# Patient Record
Sex: MALE | Race: WHITE | NOT HISPANIC OR LATINO | Employment: FULL TIME | URBAN - METROPOLITAN AREA
[De-identification: names, ages, dates, MRNs, and addresses within clinical notes are randomized per-mention and may not be internally consistent; named-entity substitution may affect disease eponyms.]

---

## 2018-10-25 ENCOUNTER — HOSPITAL ENCOUNTER (EMERGENCY)
Facility: MEDICAL CENTER | Age: 23
End: 2018-10-25
Attending: EMERGENCY MEDICINE
Payer: MEDICAID

## 2018-10-25 VITALS
OXYGEN SATURATION: 97 % | BODY MASS INDEX: 25.51 KG/M2 | WEIGHT: 158.73 LBS | HEIGHT: 66 IN | RESPIRATION RATE: 16 BRPM | SYSTOLIC BLOOD PRESSURE: 135 MMHG | TEMPERATURE: 99.1 F | DIASTOLIC BLOOD PRESSURE: 74 MMHG | HEART RATE: 102 BPM

## 2018-10-25 DIAGNOSIS — H10.021 OTHER MUCOPURULENT CONJUNCTIVITIS OF RIGHT EYE: ICD-10-CM

## 2018-10-25 DIAGNOSIS — H01.001 BLEPHARITIS OF RIGHT UPPER EYELID, UNSPECIFIED TYPE: ICD-10-CM

## 2018-10-25 PROCEDURE — 99283 EMERGENCY DEPT VISIT LOW MDM: CPT

## 2018-10-25 RX ORDER — ERYTHROMYCIN 5 MG/G
1 OINTMENT OPHTHALMIC 3 TIMES DAILY
Qty: 1 TUBE | Refills: 1 | Status: SHIPPED | OUTPATIENT
Start: 2018-10-25

## 2018-10-25 RX ORDER — AMOXICILLIN 500 MG/1
500 CAPSULE ORAL 3 TIMES DAILY
Qty: 21 CAP | Refills: 0 | Status: SHIPPED | OUTPATIENT
Start: 2018-10-25 | End: 2018-11-01

## 2018-10-25 ASSESSMENT — PAIN SCALES - GENERAL: PAINLEVEL_OUTOF10: 3

## 2018-10-25 NOTE — ED PROVIDER NOTES
"ED Provider Note    CHIEF COMPLAINT  Chief Complaint   Patient presents with   • Eye Drainage   • Rash       HPI  Travis Titus Richard Rubinstein is a 23 y.o. male who presents for evaluation of 3 days of progressive worsening of right eye redness, crusting and now redness around the upper or lower eyelid.  The patient is otherwise healthy with no significant medical or surgical history.  He does not wear contact lenses no double vision no ocular trauma.  No high fevers or chills      REVIEW OF SYSTEMS  See HPI for further details.  No high fevers chills night sweats weight loss double vision all other systems are negative.     PAST MEDICAL HISTORY  No past medical history on file.  None reported  FAMILY HISTORY  Noncontributory    SOCIAL HISTORY  Social History     Social History   • Marital status: N/A     Spouse name: N/A   • Number of children: N/A   • Years of education: N/A     Social History Main Topics   • Smoking status: Never Smoker   • Smokeless tobacco: Never Used   • Alcohol use No   • Drug use: No   • Sexual activity: Not on file     Other Topics Concern   • Not on file     Social History Narrative   • No narrative on file   No IV drug    SURGICAL HISTORY  No past surgical history on file.  No major surgery  CURRENT MEDICATIONS  Home Medications    **Home medications have not yet been reviewed for this encounter**     No regular meds    ALLERGIES  No Known Allergies    PHYSICAL EXAM  VITAL SIGNS: /74   Pulse (!) 102   Temp 37.3 °C (99.1 °F)   Resp 16   Ht 1.676 m (5' 6\")   Wt 72 kg (158 lb 11.7 oz)   SpO2 97%   BMI 25.62 kg/m²       Constitutional: Well developed, Well nourished, No acute distress, Non-toxic appearance.   HENT: Normocephalic, Atraumatic, Bilateral external ears normal, Oropharynx moist, No oral exudates, Nose normal.   Eyes: PERRLA, right eye has significant scleral injection with honey colored crusting, upper and lower lid are erythematous and slightly swollen without " abscess or stye consistent with blepharitis  Neck: Normal range of motion, No tenderness, Supple, No stridor.   Cardiovascular: Normal heart rate, Normal rhythm, No murmurs, No rubs, No gallops.   Thorax & Lungs: Normal breath sounds, No respiratory distress, No wheezing, No chest tenderness.   Abdomen: Bowel sounds normal, Soft, No tenderness, No masses, No pulsatile masses.   Skin: Warm, Dry, No erythema, No rash.   Extremities: Intact distal pulses, No edema, No tenderness, No cyanosis, No clubbing.   Musculoskeletal: Good range of motion in all major joints. No tenderness to palpation or major deformities noted.   Neurologic: Alert & oriented x 3, Normal motor function, Normal sensory function, No focal deficits noted.   Psychiatric: Affect normal, Judgment normal, Mood normal.       COURSE & MEDICAL DECISION MAKING  Pertinent Labs & Imaging studies reviewed. (See chart for details)  Patient here presents with what is clearly bacterial conjunctivitis and now he has a component of blepharitis.  He does not wear contact lenses no evidence of systemic toxicity.  He has no visual acuity changes.  I will start him on erythromycin topical ointment as well as amoxicillin    FINAL IMPRESSION  1.  Right eye conjunctivitis with blepharitis      Electronically signed by: Adams Smith, 10/25/2018 3:11 PM

## 2018-10-25 NOTE — ED TRIAGE NOTES
"PT ambulated to triage c/o a stye in his rt eye with drainage and a rash  Chief Complaint   Patient presents with   • Eye Drainage   • Rash     Blood pressure 135/74, pulse (!) 102, temperature 37.3 °C (99.1 °F), resp. rate 16, height 1.676 m (5' 6\"), weight 72 kg (158 lb 11.7 oz), SpO2 97 %.      "

## 2018-10-30 ENCOUNTER — HOSPITAL ENCOUNTER (EMERGENCY)
Facility: MEDICAL CENTER | Age: 23
End: 2018-10-30
Attending: EMERGENCY MEDICINE
Payer: MEDICAID

## 2018-10-30 VITALS
OXYGEN SATURATION: 97 % | HEART RATE: 79 BPM | TEMPERATURE: 96.9 F | HEIGHT: 65 IN | WEIGHT: 157.85 LBS | DIASTOLIC BLOOD PRESSURE: 63 MMHG | SYSTOLIC BLOOD PRESSURE: 105 MMHG | BODY MASS INDEX: 26.3 KG/M2 | RESPIRATION RATE: 16 BRPM

## 2018-10-30 DIAGNOSIS — R21 RASH: ICD-10-CM

## 2018-10-30 PROCEDURE — 99283 EMERGENCY DEPT VISIT LOW MDM: CPT

## 2018-10-30 RX ORDER — PREDNISONE 20 MG/1
60 TABLET ORAL DAILY
Qty: 15 TAB | Refills: 0 | Status: SHIPPED | OUTPATIENT
Start: 2018-10-30 | End: 2018-11-04

## 2018-10-30 NOTE — ED TRIAGE NOTES
"Per patient \" I have hives all over my body\"  NO hives can be seen on his arms, face, neck.  NO respiratory system involvement from what I can tell.  He is not itching.  NO major s.s of distress.   "

## 2018-10-31 NOTE — ED PROVIDER NOTES
"ED Provider Note    CHIEF COMPLAINT  Chief Complaint   Patient presents with   • Rash       HPI  Travis Titus Richard Rubinstein is a 23 y.o. male who presents with a rash.  The patient presents with a diffuse rash approximate 1 week in duration.  He states that has a pleuritic component.  He does not have any associated fevers.  He does not have any change in his breathing patterns noticed any difficulty with swallowing.  He has not been on any medication recently nor is he had any change in his cosmetics nor diet.  The patient is otherwise healthy.    REVIEW OF SYSTEMS  No chest pain, no difficulty with breathing    PHYSICAL EXAM  VITAL SIGNS: /63   Pulse 79   Temp 36.1 °C (96.9 °F) (Temporal)   Resp 16   Ht 1.651 m (5' 5\")   Wt 71.6 kg (157 lb 13.6 oz)   SpO2 97%   BMI 26.27 kg/m²   In general the patient does not appear toxic  Oral cavity no uvular edema nor erythema  Pulmonary chest clear to auscultation bilaterally  Skin the patient does have a diffuse maculopapular rash that does spare the hands.      COURSE & MEDICAL DECISION MAKING  Pertinent Labs & Imaging studies reviewed. (See chart for details)  This a 23-year-old male who presents with a rash.  Based on the pruritic component I suspect this is from a allergic etiology.  The patient has had this for about a week and therefore will treat the patient with 5 days of prednisone.  He will take Benadryl as needed for the itching.  I do not appreciate any evidence of an infection he does not appear toxic.  If he does not have significant improvement in 40-72 hours or if he is acutely worse he will return for repeat examination.    FINAL IMPRESSION  1.  Rash     Disposition  The patient will be discharged in stable condition      Electronically signed by: Ross Castañeda, 10/30/2018 5:04 PM      When I was discharging the patient and noted that he had amoxicillin on his medical list.  In further speaking the patient he states they put him on " amoxicillin for conjunctivitis about a week ago.  I suspect this is the source of the rash.  The patient will stop the amoxicillin.

## 2023-12-12 ENCOUNTER — HOSPITAL ENCOUNTER (EMERGENCY)
Facility: MEDICAL CENTER | Age: 28
End: 2023-12-12
Attending: EMERGENCY MEDICINE

## 2023-12-12 VITALS
OXYGEN SATURATION: 96 % | DIASTOLIC BLOOD PRESSURE: 74 MMHG | SYSTOLIC BLOOD PRESSURE: 111 MMHG | TEMPERATURE: 97.1 F | HEIGHT: 66 IN | RESPIRATION RATE: 18 BRPM | WEIGHT: 162.7 LBS | HEART RATE: 71 BPM | BODY MASS INDEX: 26.15 KG/M2

## 2023-12-12 DIAGNOSIS — K04.7 DENTAL INFECTION: ICD-10-CM

## 2023-12-12 PROCEDURE — 99283 EMERGENCY DEPT VISIT LOW MDM: CPT

## 2023-12-12 PROCEDURE — A9270 NON-COVERED ITEM OR SERVICE: HCPCS | Performed by: EMERGENCY MEDICINE

## 2023-12-12 PROCEDURE — 700102 HCHG RX REV CODE 250 W/ 637 OVERRIDE(OP): Performed by: EMERGENCY MEDICINE

## 2023-12-12 RX ORDER — PENICILLIN V POTASSIUM 500 MG/1
500 TABLET ORAL ONCE
Status: COMPLETED | OUTPATIENT
Start: 2023-12-12 | End: 2023-12-12

## 2023-12-12 RX ORDER — PENICILLIN V POTASSIUM 500 MG/1
500 TABLET ORAL 4 TIMES DAILY
Qty: 40 TABLET | Refills: 0 | Status: ACTIVE | OUTPATIENT
Start: 2023-12-12

## 2023-12-12 RX ADMIN — PENICILLIN V POTASSIUM 500 MG: 500 TABLET, FILM COATED ORAL at 12:34

## 2023-12-12 ASSESSMENT — PAIN DESCRIPTION - PAIN TYPE: TYPE: ACUTE PAIN

## 2023-12-12 NOTE — ED TRIAGE NOTES
"Chief Complaint   Patient presents with    Tooth Ache     Pt has been having tooth pain right upper and lower molars for two weeks, pain 10/10, pt states he feels like he has an infection in his teeth.       /70   Pulse 95   Temp 36.6 °C (97.8 °F) (Temporal)   Resp 18   Ht 1.676 m (5' 6\")   Wt 73.8 kg (162 lb 11.2 oz)   SpO2 97%   BMI 26.26 kg/m²     "

## 2023-12-12 NOTE — ED PROVIDER NOTES
"ER Provider Note    Scribed for Dr. Jose Swanson M.D. by Ester Banda. 12/12/2023  11:43 AM    Primary Care Provider: None noted    CHIEF COMPLAINT  Chief Complaint   Patient presents with    Tooth Ache     Pt has been having tooth pain right upper and lower molars for two weeks, pain 10/10, pt states he feels like he has an infection in his teeth.         EXTERNAL RECORDS REVIEWED  No prior records for review    HPI/ROS  LIMITATION TO HISTORY   Select: : None    OUTSIDE HISTORIAN(S):  None noted    Travis Titus Richard Rubinstein is a 28 y.o. male who presents to the ED for right sided upper and lower dental pain. The patient states he is unsure which tooth it is and that it may be multiple. He reports he has not been seen by a dentist because he does not have insurance. No alleviating factors attempted. He states he has increased pain with eating.    PAST MEDICAL HISTORY  History reviewed. No pertinent past medical history.    SURGICAL HISTORY  History reviewed. No pertinent surgical history.    FAMILY HISTORY  History reviewed. No pertinent family history.    SOCIAL HISTORY   reports that he has never smoked. He has never used smokeless tobacco. He reports that he does not drink alcohol and does not use drugs.    CURRENT MEDICATIONS  Previous Medications    ERYTHROMYCIN 5 MG/GM OINTMENT    Place 1 Application in both eyes 3 times a day.       ALLERGIES  Patient has no known allergies.    PHYSICAL EXAM  /70   Pulse 95   Temp 36.6 °C (97.8 °F) (Temporal)   Resp 18   Ht 1.676 m (5' 6\")   Wt 73.8 kg (162 lb 11.2 oz)   SpO2 97%   BMI 26.26 kg/m²   Constitutional: Alert in no apparent distress.  HENT: No signs of trauma, Bilateral external ears normal, Nose normal. Lower right 2nd molar is chipped, he has fillings in it as well, mild swelling and redness along the jaw line, No external facial swelling, No fluctuance  Eyes: Pupils are equal and reactive, Conjunctiva normal, Non-icteric.   Neck: Normal " range of motion, No tenderness, Supple, No stridor.   Lymphatic: No lymphadenopathy noted.   Cardiovascular: Regular rate and rhythm, no murmurs.   Thorax & Lungs: Normal breath sounds, No respiratory distress, No wheezing, No chest tenderness.   Abdomen: Bowel sounds normal, Soft, No tenderness, No masses, No pulsatile masses. No peritoneal signs.  Skin: Warm, Dry, No erythema, No rash.   Back: No bony tenderness, No CVA tenderness.   Extremities: Intact distal pulses, No edema, No tenderness, No cyanosis.  Musculoskeletal: Good range of motion in all major joints. No tenderness to palpation or major deformities noted.   Neurologic: Alert , Normal motor function, Normal sensory function, No focal deficits noted.   Psychiatric: Affect normal, Judgment normal, Mood normal.     COURSE & MEDICAL DECISION MAKING    ED Observation Status? No; Patient does not meet criteria for ED Observation.     INITIAL ASSESSMENT AND PLAN  Care Narrative:       11:43 AM - Patient seen and evaluated at bedside for dental pain. Discussed plan of care, including treatment with antibiotics and discharge home. He was encouraged to follow up at the [facility] clinic. He states he will have dental insurance soon and will get in to see a dentist. Patient agrees to plan of care. Patient will be treated with Veetid 500 mg for his symptoms.    ADDITIONAL PROBLEM LIST AND DISPOSITION  Patient with what appears to be a dental infection.  There is no intraoral abscess.  The patient is well-appearing at this time.  I believe that imaging is not necessary.  I believe antibiotics will be very helpful.  Will discharge home with antibiotics and strict return precautions.                 DISPOSITION AND DISCUSSIONS  I have discussed management of the patient with the following physicians and JULIO's: None noted    Discussion of management with other [facility] or appropriate source(s): None     Escalation of care considered, and ultimately not performed: diagnostic  imaging.    Barriers to care at this time, including but not limited to: Patient does not have established PCP.     Decision tools and prescription drugs considered including, but not limited to: Pain Medications: I considered prescribing narcotic pain medication, however I feel pain should be adequately controlled with over the counter NSAIDs.    The patient will return for new or worsening symptoms and is stable at the time of discharge.    DISPOSITION:  Patient will be discharged home in stable condition.    FOLLOW UP:  [facility]  [address] [facility] [address]  [phone]  In 2 days        OUTPATIENT MEDICATIONS:  New Prescriptions    PENICILLIN V POTASSIUM (VEETID) 500 MG TAB    Take 1 Tablet by mouth 4 times a day.       FINAL IMPRESSION   1. Dental infection         Ester SARMIENTO (Jenniferibmroena), am scribing for, and in the presence of, Jose Swanson M.D..    Electronically signed by: Ester Banda (Sara), 12/12/2023    Jose SARMIENTO M.D. personally performed the services described in this documentation, as scribed by Ester Banda in my presence, and it is both accurate and complete.    The note accurately reflects work and decisions made by me.  Jose Swanson M.D.  12/12/2023  1:56 PM

## 2025-01-06 ENCOUNTER — HOSPITAL ENCOUNTER (OUTPATIENT)
Facility: MEDICAL CENTER | Age: 30
End: 2025-01-06
Attending: NURSE PRACTITIONER
Payer: COMMERCIAL

## 2025-01-06 ENCOUNTER — OFFICE VISIT (OUTPATIENT)
Dept: OCCUPATIONAL MEDICINE | Facility: CLINIC | Age: 30
End: 2025-01-06

## 2025-01-06 ENCOUNTER — NON-PROVIDER VISIT (OUTPATIENT)
Dept: OCCUPATIONAL MEDICINE | Facility: CLINIC | Age: 30
End: 2025-01-06

## 2025-01-06 ENCOUNTER — APPOINTMENT (OUTPATIENT)
Dept: OCCUPATIONAL MEDICINE | Facility: CLINIC | Age: 30
End: 2025-01-06

## 2025-01-06 DIAGNOSIS — Z02.89 VISIT FOR OCCUPATIONAL HEALTH EXAMINATION: ICD-10-CM

## 2025-01-06 DIAGNOSIS — Z02.89 VISIT FOR OCCUPATIONAL HEALTH EXAMINATION: Primary | ICD-10-CM

## 2025-01-06 DIAGNOSIS — Z02.1 PHYSICAL EXAM, PRE-EMPLOYMENT: ICD-10-CM

## 2025-01-06 LAB
AMP AMPHETAMINE: NORMAL
BAR BARBITURATES: NORMAL
BZO BENZODIAZEPINES: NORMAL
COC COCAINE: NORMAL
INT CON NEG: NORMAL
INT CON POS: NORMAL
MDMA ECSTASY: NORMAL
MET METHAMPHETAMINES: NORMAL
MTD METHADONE: NORMAL
OPI OPIATES: NORMAL
OXY OXYCODONE: NORMAL
PCP PHENCYCLIDINE: NORMAL
POC URINE DRUG SCREEN OCDRS: NORMAL
THC: NORMAL

## 2025-01-06 PROCEDURE — 86480 TB TEST CELL IMMUN MEASURE: CPT | Performed by: NURSE PRACTITIONER

## 2025-01-06 PROCEDURE — 94375 RESPIRATORY FLOW VOLUME LOOP: CPT | Performed by: NURSE PRACTITIONER

## 2025-01-06 PROCEDURE — 80305 DRUG TEST PRSMV DIR OPT OBS: CPT | Performed by: NURSE PRACTITIONER

## 2025-01-06 PROCEDURE — 7101 PR PHYSICAL: Performed by: PREVENTIVE MEDICINE

## 2025-01-09 LAB
GAMMA INTERFERON BACKGROUND BLD IA-ACNC: 0.06 IU/ML
M TB IFN-G BLD-IMP: NEGATIVE
M TB IFN-G CD4+ BCKGRND COR BLD-ACNC: 0 IU/ML
MITOGEN IGNF BCKGRD COR BLD-ACNC: >10 IU/ML
QFT TB2 - NIL TBQ2: -0.01 IU/ML